# Patient Record
Sex: MALE | Race: ASIAN | NOT HISPANIC OR LATINO | URBAN - METROPOLITAN AREA
[De-identification: names, ages, dates, MRNs, and addresses within clinical notes are randomized per-mention and may not be internally consistent; named-entity substitution may affect disease eponyms.]

---

## 2023-09-20 ENCOUNTER — EMERGENCY (EMERGENCY)
Facility: HOSPITAL | Age: 38
LOS: 1 days | Discharge: HOME | End: 2023-09-20
Attending: STUDENT IN AN ORGANIZED HEALTH CARE EDUCATION/TRAINING PROGRAM
Payer: COMMERCIAL

## 2023-09-20 VITALS
RESPIRATION RATE: 17 BRPM | DIASTOLIC BLOOD PRESSURE: 80 MMHG | HEART RATE: 78 BPM | OXYGEN SATURATION: 100 % | TEMPERATURE: 98 F | SYSTOLIC BLOOD PRESSURE: 124 MMHG

## 2023-09-20 VITALS
RESPIRATION RATE: 22 BRPM | HEIGHT: 73 IN | TEMPERATURE: 99 F | OXYGEN SATURATION: 100 % | WEIGHT: 210.1 LBS | HEART RATE: 101 BPM | DIASTOLIC BLOOD PRESSURE: 94 MMHG | SYSTOLIC BLOOD PRESSURE: 153 MMHG

## 2023-09-20 PROCEDURE — 73070 X-RAY EXAM OF ELBOW: CPT

## 2023-09-20 PROCEDURE — 99284 EMERGENCY DEPT VISIT MOD MDM: CPT | Mod: 25

## 2023-09-20 PROCEDURE — 96372 THER/PROPH/DIAG INJ SC/IM: CPT

## 2023-09-20 PROCEDURE — 99284 EMERGENCY DEPT VISIT MOD MDM: CPT

## 2023-09-20 PROCEDURE — 73070 X-RAY EXAM OF ELBOW: CPT | Mod: 26,RT

## 2023-09-20 RX ORDER — KETOROLAC TROMETHAMINE 30 MG/ML
15 SYRINGE (ML) INJECTION ONCE
Refills: 0 | Status: DISCONTINUED | OUTPATIENT
Start: 2023-09-20 | End: 2023-09-20

## 2023-09-20 RX ORDER — ACETAMINOPHEN 500 MG
975 TABLET ORAL ONCE
Refills: 0 | Status: COMPLETED | OUTPATIENT
Start: 2023-09-20 | End: 2023-09-20

## 2023-09-20 RX ORDER — LIDOCAINE 4 G/100G
1 CREAM TOPICAL ONCE
Refills: 0 | Status: COMPLETED | OUTPATIENT
Start: 2023-09-20 | End: 2023-09-20

## 2023-09-20 RX ORDER — METHOCARBAMOL 500 MG/1
750 TABLET, FILM COATED ORAL ONCE
Refills: 0 | Status: COMPLETED | OUTPATIENT
Start: 2023-09-20 | End: 2023-09-20

## 2023-09-20 RX ADMIN — LIDOCAINE 1 PATCH: 4 CREAM TOPICAL at 05:04

## 2023-09-20 RX ADMIN — METHOCARBAMOL 750 MILLIGRAM(S): 500 TABLET, FILM COATED ORAL at 05:17

## 2023-09-20 RX ADMIN — Medication 15 MILLIGRAM(S): at 04:50

## 2023-09-20 RX ADMIN — Medication 15 MILLIGRAM(S): at 05:17

## 2023-09-20 RX ADMIN — Medication 975 MILLIGRAM(S): at 03:23

## 2023-09-20 RX ADMIN — Medication 15 MILLIGRAM(S): at 03:45

## 2023-09-20 NOTE — ED PROVIDER NOTE - PATIENT PORTAL LINK FT
You can access the FollowMyHealth Patient Portal offered by [facility] by registering at the following website: [email or web address]. By joining [employer]’s FollowMyHealth portal, you will also be able to view your health information using other applications (apps) compatible with our system.

## 2023-09-20 NOTE — ED PROVIDER NOTE - PHYSICAL EXAMINATION
MSK: +ttp to lateral and medial epicondyle. patient unable to fully extend his arm. no warmth, swelling, erythema.  left leg: no ttp. no erythema, warmth or swelling. normal ROM of LLE.

## 2023-09-20 NOTE — ED PROVIDER NOTE - PROGRESS NOTE DETAILS
Marvin Jacobo PA-C: patient reports improvement of symptoms. advised to continue taking Tylenol/Motrin as needed. advised to follow up with orthopedics. patient in agreement with plan. well appearing. stable for discharge. discussed with Dr. Montiel.

## 2023-09-20 NOTE — ED PROVIDER NOTE - OBJECTIVE STATEMENT
39 y/o male, Hillary speaking,  ID [id number], presents to the ER with complaint of right elbow pain x 4 days. patient states hurts to move his elbow. states pain is constant. patient also states has pain to his left leg. states pain radiates from buttock down left leg. denies any fall or trauma. denies LOC. denies f/n/v/d, CP, SOB, HA, dizziness, abdominal pain, urinary symptoms, cough.

## 2023-09-20 NOTE — ED PROVIDER NOTE - ATTENDING APP SHARED VISIT CONTRIBUTION OF CARE
Attending MD CORRIE Montiel- This was a shared visit with FATOU.  I have reviewed and discussed the case with the FATOU and agree with verified documentation unless otherwise documented.  I have independently spoken with and examined the patient and my documentation of history/physical exam and MDM are as follows:    38 M with no PMH presenting with 4 days of atraumatic right elbow pain, worsened with range of motion.  Nonradiating, no associated paresthesias or weakness.  Also noting pain from left buttock radiating down left leg.  Patient works as a  and is predominantly right-handed.  Denies any unusual strenuous activity or heavy lifting.  No fever, chest pain, shortness of breath, cough, abdominal pain, GI symptoms, dysuria.  On exam, patient no acute distress.  No tenderness to right lateral epicondyle with limited active range of motion, improved passive range of motion.  No tenderness to muscle groups.  No midline C/T/L-spine tenderness, negative straight leg raise, intact range of motion to bilateral lower extremities.    MDM–otherwise healthy young male presenting with 4 days of atraumatic right elbow pain and left lower extremity pain, physical exam suggestive of lateral epicondylitis.  Will treat symptomatically obtain x-ray to evaluate for occult fracture or dislocation given severely limited range of motion.    X-ray negative for acute pathology.  On reassessment, patient endorsing some symptomatic improvement; passive and active range of motion significantly increased.  Counseled patient regarding aggressive pain regimen with over-the-counter medications and range of motion exercises to perform at home, stable for discharge with orthopedic follow-up.

## 2023-09-20 NOTE — ED ADULT TRIAGE NOTE - CHIEF COMPLAINT QUOTE
R arm pain and L lower back pain x1 day  Saw PCP this AM prescribed Naproxen R arm pain and L lower back pain x1 day, denies injury  Saw PCP this AM prescribed Naproxen

## 2023-09-20 NOTE — ED ADULT NURSE NOTE - OBJECTIVE STATEMENT
38 year old, Hillary speaking male, presenting to ED complaining of right elbow pain x4 days. Patient states he is unable to move his arm d/t pain. Pain is constant and rated at 10/10. Patient also endorsing pain in his back and down into his left leg. Denies any falls or trauma. no changes in bowel habits. Denies CP, SOB, HA, n/v/d, abdominal pain, difficulty urinating. Heart rate regular. Respirations clear and equal bilaterally. Abdomen soft, nontender and nondistended. Peripheral pulses strong and equal bilaterally. ROM of RUE limited d/t pain. Safety and comfort measures maintained.

## 2023-09-20 NOTE — ED PROVIDER NOTE - NSFOLLOWUPINSTRUCTIONS_ED_ALL_ED_FT
1. It is important to follow up with your primary care doctor in 1-2 days  follow up with orthopedics in 1-2 days     2. bring a copy of all your results to your follow up appointments    3. you can take Tylenol as needed for pain. you can take 650mg of Tylenol every 6 hours as needed for pain. do not take more than 4000mg in a 24 hour period.     you can take 600mg of motrin every 6 hours as needed for pain     4. if your symptoms worsen, persist, or if any new symptoms develop, or if you experience any signs of distress, return to the ER right away.